# Patient Record
Sex: MALE | Race: WHITE | NOT HISPANIC OR LATINO
[De-identification: names, ages, dates, MRNs, and addresses within clinical notes are randomized per-mention and may not be internally consistent; named-entity substitution may affect disease eponyms.]

---

## 2020-11-20 PROBLEM — Z00.00 ENCOUNTER FOR PREVENTIVE HEALTH EXAMINATION: Status: ACTIVE | Noted: 2020-11-20

## 2020-11-25 ENCOUNTER — APPOINTMENT (OUTPATIENT)
Dept: UROLOGY | Facility: CLINIC | Age: 50
End: 2020-11-25
Payer: COMMERCIAL

## 2020-11-25 VITALS
HEART RATE: 79 BPM | SYSTOLIC BLOOD PRESSURE: 140 MMHG | HEIGHT: 68 IN | BODY MASS INDEX: 21.98 KG/M2 | WEIGHT: 145 LBS | TEMPERATURE: 97.8 F | RESPIRATION RATE: 17 BRPM | DIASTOLIC BLOOD PRESSURE: 81 MMHG

## 2020-11-25 PROCEDURE — 99204 OFFICE O/P NEW MOD 45 MIN: CPT

## 2020-11-25 NOTE — ASSESSMENT
[FreeTextEntry1] : 50M no PMH here w/urinary retention, BPH\par -- CIC teaching\par -- MRI prostate to eval prostate anatomy\par -- c/w dutasteride and alfuzosin

## 2020-11-25 NOTE — PHYSICAL EXAM
[General Appearance - Well Developed] : well developed [General Appearance - Well Nourished] : well nourished [Heart Rate And Rhythm] : Heart rate and rhythm were normal [] : no respiratory distress [Abdomen Soft] : soft [Abdomen Tenderness] : non-tender [Prostate Tenderness] : the prostate was not tender [No Prostate Nodules] : no prostate nodules [Prostate Size ___ gm] : prostate size [unfilled] gm [Normal Station and Gait] : the gait and station were normal for the patient's age [Skin Color & Pigmentation] : normal skin color and pigmentation [No Focal Deficits] : no focal deficits [Oriented To Time, Place, And Person] : oriented to person, place, and time [No Palpable Adenopathy] : no palpable adenopathy [FreeTextEntry1] : avery in place

## 2020-11-25 NOTE — HISTORY OF PRESENT ILLNESS
[FreeTextEntry1] : 50M no PMH here w/urinary retention. Reports inability to void in mid-October, subsequently had catheter placed in ED. Pt failed TOV at outside urologist Dr. VALENCIA Farrar and required catheter to be replaced. Subsequent cystoscopy revealed trilobar hypertrophy, UDS revealed normal filling phase w/normal sensation, desire w/o detrusor overactivity. Pt voided 4cc, max flow 8.2 m/s, PDet 58cm H2O consistent w/obstructive uropathy. PSA 11/3 22.1- obtained during retention episode. \par \par Notably, pt was referred to urologist after perineal discomfort 5 years prior. Pt states he had an elevated PSA, and was subsequently biopsied, according to pt - benign. Pt was placed on alfuzosin and start dutasteride 1.5 mo prior to visit.\par \par PMH: none\par PSH: none\par NKDA\par SHx: never smoker\par FHx: no family hx of  malignancies \par

## 2020-12-16 ENCOUNTER — APPOINTMENT (OUTPATIENT)
Dept: UROLOGY | Facility: CLINIC | Age: 50
End: 2020-12-16
Payer: COMMERCIAL

## 2020-12-16 VITALS
BODY MASS INDEX: 22.73 KG/M2 | WEIGHT: 150 LBS | DIASTOLIC BLOOD PRESSURE: 68 MMHG | TEMPERATURE: 97 F | SYSTOLIC BLOOD PRESSURE: 130 MMHG | HEART RATE: 79 BPM | HEIGHT: 68 IN | RESPIRATION RATE: 17 BRPM

## 2020-12-16 DIAGNOSIS — R33.9 RETENTION OF URINE, UNSPECIFIED: ICD-10-CM

## 2020-12-16 PROCEDURE — 99213 OFFICE O/P EST LOW 20 MIN: CPT

## 2020-12-16 PROCEDURE — 99072 ADDL SUPL MATRL&STAF TM PHE: CPT

## 2020-12-16 NOTE — ASSESSMENT
[FreeTextEntry1] : 50M no PMH here w/urinary retention, BPH\par -- mpMRI reviewed - 78cc prostate, overall PIRADS2, no suspicious lesions, small intravesical median lobe\par -- r/b/a of outlet procedures discussed, including retrograde ejaculation\par -- will plan on transperineal biopsy

## 2020-12-16 NOTE — HISTORY OF PRESENT ILLNESS
[FreeTextEntry1] : 50M no PMH here w/urinary retention. Reports inability to void in mid-October, subsequently had catheter placed in ED. Pt failed TOV at outside urologist Dr. VALENCIA Farrar and required catheter to be replaced. Subsequent cystoscopy revealed trilobar hypertrophy, UDS revealed normal filling phase w/normal sensation, desire w/o detrusor overactivity. Pt voided 4cc, max flow 8.2 m/s, PDet 58cm H2O consistent w/obstructive uropathy. PSA 11/3 22.1- obtained during retention episode. \par \par Notably, pt was referred to urologist after perineal discomfort 5 years prior. Pt states he had an elevated PSA, and was subsequently biopsied, according to pt - benign. Pt was placed on alfuzosin and start dutasteride 1.5 mo prior to visit.\par \par Interval hx: was taught CIC during last visit. Unable to catheterize at home that day, developed some hematuria. Indwelling catheter placed in ED. Last week, saw Dr. Farrar, who exchanged catheter. Pt states PSA was ~15 at that time. Pt otherwise at baseline, no fevers. Some cloudy urine.

## 2020-12-30 ENCOUNTER — APPOINTMENT (OUTPATIENT)
Dept: UROLOGY | Facility: CLINIC | Age: 50
End: 2020-12-30
Payer: COMMERCIAL

## 2020-12-30 ENCOUNTER — OUTPATIENT (OUTPATIENT)
Dept: OUTPATIENT SERVICES | Facility: HOSPITAL | Age: 50
LOS: 1 days | End: 2020-12-30

## 2020-12-30 ENCOUNTER — OUTPATIENT (OUTPATIENT)
Dept: OUTPATIENT SERVICES | Facility: HOSPITAL | Age: 50
LOS: 1 days | End: 2020-12-30
Payer: COMMERCIAL

## 2020-12-30 VITALS — HEART RATE: 62 BPM | DIASTOLIC BLOOD PRESSURE: 88 MMHG | SYSTOLIC BLOOD PRESSURE: 133 MMHG

## 2020-12-30 VITALS
RESPIRATION RATE: 16 BRPM | DIASTOLIC BLOOD PRESSURE: 80 MMHG | HEART RATE: 76 BPM | HEIGHT: 69 IN | TEMPERATURE: 100 F | WEIGHT: 143.96 LBS | SYSTOLIC BLOOD PRESSURE: 126 MMHG | OXYGEN SATURATION: 98 %

## 2020-12-30 VITALS — SYSTOLIC BLOOD PRESSURE: 153 MMHG | HEART RATE: 65 BPM | DIASTOLIC BLOOD PRESSURE: 97 MMHG | RESPIRATION RATE: 17 BRPM

## 2020-12-30 DIAGNOSIS — N40.0 BENIGN PROSTATIC HYPERPLASIA WITHOUT LOWER URINARY TRACT SYMPTOMS: ICD-10-CM

## 2020-12-30 DIAGNOSIS — N40.1 BENIGN PROSTATIC HYPERPLASIA WITH LOWER URINARY TRACT SYMPTOMS: ICD-10-CM

## 2020-12-30 DIAGNOSIS — R35.0 FREQUENCY OF MICTURITION: ICD-10-CM

## 2020-12-30 DIAGNOSIS — Z98.890 OTHER SPECIFIED POSTPROCEDURAL STATES: Chronic | ICD-10-CM

## 2020-12-30 DIAGNOSIS — N39.0 URINARY TRACT INFECTION, SITE NOT SPECIFIED: ICD-10-CM

## 2020-12-30 PROCEDURE — 55700: CPT

## 2020-12-30 PROCEDURE — 76942 ECHO GUIDE FOR BIOPSY: CPT | Mod: 26,59

## 2020-12-30 PROCEDURE — 76942 ECHO GUIDE FOR BIOPSY: CPT | Mod: 59

## 2020-12-30 PROCEDURE — 76872 US TRANSRECTAL: CPT | Mod: 26

## 2020-12-30 PROCEDURE — 76872 US TRANSRECTAL: CPT

## 2020-12-30 NOTE — H&P PST ADULT - NSANTHOSAYNRD_GEN_A_CORE
never tested/No. CYNDIE screening performed.  STOP BANG Legend: 0-2 = LOW Risk; 3-4 = INTERMEDIATE Risk; 5-8 = HIGH Risk

## 2020-12-30 NOTE — H&P PST ADULT - NSICDXPROBLEM_GEN_ALL_CORE_FT
PROBLEM DIAGNOSES  Problem: BPH (benign prostatic hyperplasia)  Assessment and Plan:        PROBLEM DIAGNOSES  Problem: BPH (benign prostatic hyperplasia)  Assessment and Plan: Pt scheduled for cystoscopy, aqua ablation transurethral waterjet ablation of prostate.   labs pending,  Preop instructions provided to pt.   famotidine provided to pt with instructions.  Pt going for COVID testing on 1/1/21.

## 2020-12-30 NOTE — H&P PST ADULT - NS PRO ABUSE SCREEN AFRAID ANYONE YN
Chief Complaint   Patient presents with     Blood Draw     Labs Drawn      Labs drawn from central line. Both lumens flushed with heparin.     Lauren Schoen, RN     no

## 2020-12-30 NOTE — H&P PST ADULT - ASSESSMENT
50 yrs old male with h/o enlarged prostate presents for preop eval to have cystoscopy, aqua ablation, transurethral waterjet ablation of prostate.

## 2020-12-30 NOTE — H&P PST ADULT - LAB RESULTS AND INTERPRETATION
PA info faxed    Plan does not cover Testosterone (ANDROGEL) 20.25 MG/1.25GM (1.62%) GEL.  Please go to [email or web address] to initiate Prior Auth or change med.    Insurance type and ID number: Key:  [id number]      Additional Information:     Devora Resendiz     cbc, bmp, and urine culture pending

## 2020-12-30 NOTE — H&P PST ADULT - RS GEN PE MLT RESP DETAILS PC
airway patent/respirations non-labored/clear to auscultation bilaterally/no rales/no rhonchi/no wheezes

## 2020-12-31 ENCOUNTER — NON-APPOINTMENT (OUTPATIENT)
Age: 50
End: 2020-12-31

## 2020-12-31 DIAGNOSIS — Z01.818 ENCOUNTER FOR OTHER PREPROCEDURAL EXAMINATION: ICD-10-CM

## 2020-12-31 PROBLEM — N39.0 ACUTE UTI: Status: ACTIVE | Noted: 2020-12-31

## 2020-12-31 LAB
CORE LAB BIOPSY: NORMAL
HCT VFR BLD CALC: 44.7 % — SIGNIFICANT CHANGE UP (ref 39–50)
HGB BLD-MCNC: 14.7 G/DL — SIGNIFICANT CHANGE UP (ref 13–17)
MCHC RBC-ENTMCNC: 28.9 PG — SIGNIFICANT CHANGE UP (ref 27–34)
MCHC RBC-ENTMCNC: 32.9 GM/DL — SIGNIFICANT CHANGE UP (ref 32–36)
MCV RBC AUTO: 88 FL — SIGNIFICANT CHANGE UP (ref 80–100)
PLATELET # BLD AUTO: 265 K/UL — SIGNIFICANT CHANGE UP (ref 150–400)
RBC # BLD: 5.08 M/UL — SIGNIFICANT CHANGE UP (ref 4.2–5.8)
RBC # FLD: 12.7 % — SIGNIFICANT CHANGE UP (ref 10.3–14.5)
WBC # BLD: 11.79 K/UL — HIGH (ref 3.8–10.5)
WBC # FLD AUTO: 11.79 K/UL — HIGH (ref 3.8–10.5)

## 2021-01-01 ENCOUNTER — APPOINTMENT (OUTPATIENT)
Dept: DISASTER EMERGENCY | Facility: CLINIC | Age: 51
End: 2021-01-01

## 2021-01-01 LAB
ANION GAP SERPL CALC-SCNC: 25 MMOL/L — HIGH (ref 5–17)
BUN SERPL-MCNC: 20 MG/DL — SIGNIFICANT CHANGE UP (ref 7–23)
CALCIUM SERPL-MCNC: 9.3 MG/DL — SIGNIFICANT CHANGE UP (ref 8.4–10.5)
CHLORIDE SERPL-SCNC: 101 MMOL/L — SIGNIFICANT CHANGE UP (ref 96–108)
CO2 SERPL-SCNC: 18 MMOL/L — LOW (ref 22–31)
CREAT SERPL-MCNC: 0.91 MG/DL — SIGNIFICANT CHANGE UP (ref 0.5–1.3)
CULTURE RESULTS: SIGNIFICANT CHANGE UP
GLUCOSE SERPL-MCNC: 28 MG/DL — CRITICAL LOW (ref 70–99)
POTASSIUM SERPL-MCNC: 3.8 MMOL/L — SIGNIFICANT CHANGE UP (ref 3.5–5.3)
POTASSIUM SERPL-SCNC: 3.8 MMOL/L — SIGNIFICANT CHANGE UP (ref 3.5–5.3)
SODIUM SERPL-SCNC: 144 MMOL/L — SIGNIFICANT CHANGE UP (ref 135–145)
SPECIMEN SOURCE: SIGNIFICANT CHANGE UP

## 2021-01-02 ENCOUNTER — APPOINTMENT (OUTPATIENT)
Dept: MRI IMAGING | Facility: IMAGING CENTER | Age: 51
End: 2021-01-02

## 2021-01-02 PROBLEM — Z87.438 PERSONAL HISTORY OF OTHER DISEASES OF MALE GENITAL ORGANS: Chronic | Status: ACTIVE | Noted: 2020-12-30

## 2021-01-02 PROBLEM — R39.11 HESITANCY OF MICTURITION: Chronic | Status: ACTIVE | Noted: 2020-12-30

## 2021-01-02 LAB — SARS-COV-2 N GENE NPH QL NAA+PROBE: NOT DETECTED

## 2021-01-03 ENCOUNTER — TRANSCRIPTION ENCOUNTER (OUTPATIENT)
Age: 51
End: 2021-01-03

## 2021-01-04 ENCOUNTER — INPATIENT (INPATIENT)
Facility: HOSPITAL | Age: 51
LOS: 0 days | Discharge: HOME | End: 2021-01-05
Attending: UROLOGY | Admitting: UROLOGY
Payer: COMMERCIAL

## 2021-01-04 ENCOUNTER — APPOINTMENT (OUTPATIENT)
Dept: UROLOGY | Facility: HOSPITAL | Age: 51
End: 2021-01-04

## 2021-01-04 ENCOUNTER — RESULT REVIEW (OUTPATIENT)
Age: 51
End: 2021-01-04

## 2021-01-04 VITALS
OXYGEN SATURATION: 98 % | SYSTOLIC BLOOD PRESSURE: 126 MMHG | TEMPERATURE: 100 F | HEART RATE: 76 BPM | RESPIRATION RATE: 16 BRPM | DIASTOLIC BLOOD PRESSURE: 80 MMHG | HEIGHT: 69 IN | WEIGHT: 143.96 LBS

## 2021-01-04 DIAGNOSIS — I95.9 HYPOTENSION, UNSPECIFIED: ICD-10-CM

## 2021-01-04 DIAGNOSIS — Z87.438 PERSONAL HISTORY OF OTHER DISEASES OF MALE GENITAL ORGANS: ICD-10-CM

## 2021-01-04 DIAGNOSIS — N39.0 URINARY TRACT INFECTION, SITE NOT SPECIFIED: ICD-10-CM

## 2021-01-04 DIAGNOSIS — Z98.890 OTHER SPECIFIED POSTPROCEDURAL STATES: Chronic | ICD-10-CM

## 2021-01-04 DIAGNOSIS — N40.1 BENIGN PROSTATIC HYPERPLASIA WITH LOWER URINARY TRACT SYMPTOMS: ICD-10-CM

## 2021-01-04 PROCEDURE — 99223 1ST HOSP IP/OBS HIGH 75: CPT | Mod: 25

## 2021-01-04 PROCEDURE — 88305 TISSUE EXAM BY PATHOLOGIST: CPT | Mod: 26

## 2021-01-04 RX ORDER — PIPERACILLIN AND TAZOBACTAM 4; .5 G/20ML; G/20ML
3.38 INJECTION, POWDER, LYOPHILIZED, FOR SOLUTION INTRAVENOUS ONCE
Refills: 0 | Status: COMPLETED | OUTPATIENT
Start: 2021-01-04 | End: 2021-01-04

## 2021-01-04 RX ORDER — HEPARIN SODIUM 5000 [USP'U]/ML
5000 INJECTION INTRAVENOUS; SUBCUTANEOUS EVERY 8 HOURS
Refills: 0 | Status: DISCONTINUED | OUTPATIENT
Start: 2021-01-04 | End: 2021-01-05

## 2021-01-04 RX ORDER — ACETAMINOPHEN 500 MG
650 TABLET ORAL EVERY 6 HOURS
Refills: 0 | Status: DISCONTINUED | OUTPATIENT
Start: 2021-01-04 | End: 2021-01-05

## 2021-01-04 RX ORDER — PIPERACILLIN AND TAZOBACTAM 4; .5 G/20ML; G/20ML
3.38 INJECTION, POWDER, LYOPHILIZED, FOR SOLUTION INTRAVENOUS EVERY 8 HOURS
Refills: 0 | Status: DISCONTINUED | OUTPATIENT
Start: 2021-01-04 | End: 2021-01-05

## 2021-01-04 RX ORDER — SODIUM CHLORIDE 9 MG/ML
1000 INJECTION INTRAMUSCULAR; INTRAVENOUS; SUBCUTANEOUS ONCE
Refills: 0 | Status: COMPLETED | OUTPATIENT
Start: 2021-01-04 | End: 2021-01-05

## 2021-01-04 RX ORDER — OXYCODONE HYDROCHLORIDE 5 MG/1
10 TABLET ORAL EVERY 4 HOURS
Refills: 0 | Status: DISCONTINUED | OUTPATIENT
Start: 2021-01-04 | End: 2021-01-05

## 2021-01-04 RX ORDER — OXYCODONE HYDROCHLORIDE 5 MG/1
5 TABLET ORAL EVERY 4 HOURS
Refills: 0 | Status: DISCONTINUED | OUTPATIENT
Start: 2021-01-04 | End: 2021-01-05

## 2021-01-04 RX ORDER — SODIUM CHLORIDE 9 MG/ML
1000 INJECTION, SOLUTION INTRAVENOUS
Refills: 0 | Status: DISCONTINUED | OUTPATIENT
Start: 2021-01-04 | End: 2021-01-05

## 2021-01-04 RX ORDER — PIPERACILLIN AND TAZOBACTAM 4; .5 G/20ML; G/20ML
3.38 INJECTION, POWDER, LYOPHILIZED, FOR SOLUTION INTRAVENOUS EVERY 6 HOURS
Refills: 0 | Status: DISCONTINUED | OUTPATIENT
Start: 2021-01-04 | End: 2021-01-04

## 2021-01-04 RX ORDER — SODIUM CHLORIDE 9 MG/ML
1000 INJECTION, SOLUTION INTRAVENOUS
Refills: 0 | Status: DISCONTINUED | OUTPATIENT
Start: 2021-01-04 | End: 2021-01-04

## 2021-01-04 RX ADMIN — PIPERACILLIN AND TAZOBACTAM 200 GRAM(S): 4; .5 INJECTION, POWDER, LYOPHILIZED, FOR SOLUTION INTRAVENOUS at 15:10

## 2021-01-04 RX ADMIN — PIPERACILLIN AND TAZOBACTAM 25 GRAM(S): 4; .5 INJECTION, POWDER, LYOPHILIZED, FOR SOLUTION INTRAVENOUS at 21:55

## 2021-01-04 RX ADMIN — HEPARIN SODIUM 5000 UNIT(S): 5000 INJECTION INTRAVENOUS; SUBCUTANEOUS at 15:08

## 2021-01-04 RX ADMIN — HEPARIN SODIUM 5000 UNIT(S): 5000 INJECTION INTRAVENOUS; SUBCUTANEOUS at 21:58

## 2021-01-04 NOTE — CONSULT NOTE ADULT - SUBJECTIVE AND OBJECTIVE BOX
PMD Dr. David Hansen [phone]    Patient is a 50y old  Male who presents with a chief complaint of     HPI: 50 yrs old male with h/o enlarged prostate presents for preop eval to have cystoscopy, aqua ablation, transurethral waterjet ablation of prostate.    Allergies: No Known Allergies    HOME MEDICATIONS:   alfuzosin 10 qd  Bactrim 1 bid    MEDICATIONS  (STANDING):  heparin   Injectable 5000 Unit(s) SubCutaneous every 8 hours  lactated ringers. 1000 milliLiter(s) (125 mL/Hr) IV Continuous <Continuous>  piperacillin/tazobactam IVPB.. 3.375 Gram(s) IV Intermittent every 8 hours    MEDICATIONS  (PRN):  acetaminophen   Tablet .. 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;  rare alcohol    FAMILY HISTORY:  [ x] No pertinent family history in first degree relatives   father - heart problem ?stents    REVIEW OF SYSTEMS:  CONSTITUTIONAL: No fever, weight loss, or fatigue  EYES: No eye pain, visual disturbances, or discharge  ENMT:  No difficulty hearing, tinnitus, vertigo; No sinus or throat pain  NECK: No pain or stiffness  BREASTS: No pain, masses, or nipple discharge  RESPIRATORY: No cough, wheezing, chills or hemoptysis; No shortness of breath  CARDIOVASCULAR: No chest pain, palpitations, dizziness, or leg swelling  GASTROINTESTINAL: No abdominal or epigastric pain. No nausea, vomiting, or hematemesis; No diarrhea or constipation. No melena or hematochezia.  GENITOURINARY: per HPI  NEUROLOGICAL: No headaches, memory loss, loss of strength, numbness, or tremors  SKIN: No itching, burning, rashes, or lesions   LYMPH NODES: No enlarged glands  ENDOCRINE: No heat or cold intolerance; No hair loss  MUSCULOSKELETAL: No muscle or back pain  PSYCHIATRIC: No depression, anxiety, mood swings, or difficulty sleeping  HEME/LYMPH: No easy bruising, or bleeding gums  ALLERGY AND IMMUNOLOGIC: No hives or eczema  [  ] All other ROS negative  [  ] Unable to obtain due to poor mental status    Vital Signs Last 24 Hrs  T(C): 36.3 (04 Jan 2021 15:00), Max: 37.6 (04 Jan 2021 08:51)  T(F): 97.3 (04 Jan 2021 15:00), Max: 99.7 (04 Jan 2021 08:51)  HR: 53 (04 Jan 2021 16:00) (50 - 76)  BP: 96/52 (04 Jan 2021 16:00) (89/51 - 126/80)  BP(mean): 65 (04 Jan 2021 16:00) (60 - 89)  RR: 14 (04 Jan 2021 16:00) (12 - 16)  SpO2: 98% (04 Jan 2021 16:00) (98% - 100%)    PHYSICAL EXAM:  GENERAL: resting on stretcher in PACU  HEAD:  Atraumatic, Normocephalic  EYES: EOMI, PERRLA, conjunctiva and sclera clear  ENMT: Moist mucous membranes  NECK: Supple, No JVD  RESPIRATORY: Clear to auscultation bilaterally; No rales, rhonchi, wheezing, or rubs  CARDIOVASCULAR: Regular rate and rhythm; No murmurs, rubs, or gallops  GASTROINTESTINAL: Soft, Nontender, Nondistended; Bowel sounds present  GENITOURINARY: +avery CBI pink  EXTREMITIES:  2+ Peripheral Pulses, No clubbing, cyanosis, or edema  NERVOUS SYSTEM:  Alert & Oriented X3; Moving all 4 extremities; No gross sensory deficits  HEME/LYMPH: No lymphadenopathy noted  SKIN: No rashes or lesions  PSYCH: calm, appropriate    LABS:  Basic Metabolic Panel (01.01.21 @ 04:19)    Sodium, Serum: 144 mmol/L    Potassium, Serum: 3.8 mmol/L    Chloride, Serum: 101 mmol/L    Carbon Dioxide, Serum: 18 mmol/L    Anion Gap, Serum: 25 mmol/L    Blood Urea Nitrogen, Serum: 20 mg/dL    Creatinine, Serum: 0.91 mg/dL    Glucose, Serum: 28: Test Repeated  Critical value  If not drawn in a gray top tube, low glucose may be due to delays in  centrifugation. mg/dL    Calcium, Total Serum: 9.3 mg/dL    eGFR if Non : 99:     Complete Blood Count (12.31.20 @ 03:49)    WBC Count: 11.79 K/uL    RBC Count: 5.08 M/uL    Hemoglobin: 14.7 g/dL    Hematocrit: 44.7 %    Mean Cell Volume: 88.0 fl    Mean Cell Hemoglobin: 28.9 pg    Mean Cell Hemoglobin Conc: 32.9 gm/dL    Red Cell Distrib Width: 12.7 %    Platelet Count - Automated: 265 K/uL    RADIOLOGY & ADDITIONAL STUDIES:    EKG:   Personally Reviewed:  [ ] YES     Imaging:   Personally Reviewed:  [ ] YES               Consultant(s) notes reviewed:    Care Discussed with Consultant(s)/Other Providers: urology re overall care PMD Dr. David Hansen [phone]    Patient is a 50y old  Male who presents with a chief complaint of BPH with urinary obstruction    HPI: 50M in October began having trouble voiding, required avery, failed TOV, cysto showed trilobar hypertrophy, biopsy done 12/30.  Had low grade temp and mild hematuria, started on Bactrim. Now 1/4/21 s/p cystoscopy, aqua ablation, transurethral waterjet ablation of prostate. +CBI.  Pt doing ok, no c/o pain, SOB, nausea.      Allergies: No Known Allergies    HOME MEDICATIONS:   alfuzosin 10 qd  Bactrim 1 bid    MEDICATIONS  (STANDING):  heparin   Injectable 5000 Unit(s) SubCutaneous every 8 hours  lactated ringers. 1000 milliLiter(s) (125 mL/Hr) IV Continuous <Continuous>  piperacillin/tazobactam IVPB.. 3.375 Gram(s) IV Intermittent every 8 hours    MEDICATIONS  (PRN):  acetaminophen   Tablet .. 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;  rare alcohol    FAMILY HISTORY:  [ x] No pertinent family history in first degree relatives   father - heart problem ?stents    REVIEW OF SYSTEMS:  CONSTITUTIONAL: No fever, weight loss, or fatigue  EYES: No eye pain, visual disturbances, or discharge  ENMT:  No difficulty hearing, tinnitus, vertigo; No sinus or throat pain  NECK: No pain or stiffness  BREASTS: No pain, masses, or nipple discharge  RESPIRATORY: No cough, wheezing, chills or hemoptysis; No shortness of breath  CARDIOVASCULAR: No chest pain, palpitations, dizziness, or leg swelling  GASTROINTESTINAL: No abdominal or epigastric pain. No nausea, vomiting, or hematemesis; No diarrhea or constipation. No melena or hematochezia.  GENITOURINARY: per HPI  NEUROLOGICAL: No headaches, memory loss, loss of strength, numbness, or tremors  SKIN: No itching, burning, rashes, or lesions   LYMPH NODES: No enlarged glands  ENDOCRINE: No heat or cold intolerance; No hair loss  MUSCULOSKELETAL: No muscle or back pain  PSYCHIATRIC: No depression, anxiety, mood swings, or difficulty sleeping  HEME/LYMPH: No easy bruising, or bleeding gums  ALLERGY AND IMMUNOLOGIC: No hives or eczema  [  ] All other ROS negative  [  ] Unable to obtain due to poor mental status    Vital Signs Last 24 Hrs  T(C): 36.3 (04 Jan 2021 15:00), Max: 37.6 (04 Jan 2021 08:51)  T(F): 97.3 (04 Jan 2021 15:00), Max: 99.7 (04 Jan 2021 08:51)  HR: 53 (04 Jan 2021 16:00) (50 - 76)  BP: 96/52 (04 Jan 2021 16:00) (89/51 - 126/80)  BP(mean): 65 (04 Jan 2021 16:00) (60 - 89)  RR: 14 (04 Jan 2021 16:00) (12 - 16)  SpO2: 98% (04 Jan 2021 16:00) (98% - 100%)    PHYSICAL EXAM:  GENERAL: resting on stretcher in PACU  HEAD:  Atraumatic, Normocephalic  EYES: EOMI, PERRLA, conjunctiva and sclera clear  ENMT: Moist mucous membranes  NECK: Supple, No JVD  RESPIRATORY: Clear to auscultation bilaterally; No rales, rhonchi, wheezing, or rubs  CARDIOVASCULAR: Regular rate and rhythm; No murmurs, rubs, or gallops  GASTROINTESTINAL: Soft, Nontender, Nondistended; Bowel sounds present  GENITOURINARY: +avery CBI pink  EXTREMITIES:  2+ Peripheral Pulses, No clubbing, cyanosis, or edema  NERVOUS SYSTEM:  Alert & Oriented X3; Moving all 4 extremities; No gross sensory deficits  HEME/LYMPH: No lymphadenopathy noted  SKIN: No rashes or lesions  PSYCH: calm, appropriate    LABS:  Basic Metabolic Panel (01.01.21 @ 04:19)    Sodium, Serum: 144 mmol/L    Potassium, Serum: 3.8 mmol/L    Chloride, Serum: 101 mmol/L    Carbon Dioxide, Serum: 18 mmol/L    Anion Gap, Serum: 25 mmol/L    Blood Urea Nitrogen, Serum: 20 mg/dL    Creatinine, Serum: 0.91 mg/dL    Glucose, Serum: 28: Test Repeated  Critical value  If not drawn in a gray top tube, low glucose may be due to delays in  centrifugation. mg/dL    Calcium, Total Serum: 9.3 mg/dL    eGFR if Non : 99:     Complete Blood Count (12.31.20 @ 03:49)    WBC Count: 11.79 K/uL    RBC Count: 5.08 M/uL    Hemoglobin: 14.7 g/dL    Hematocrit: 44.7 %    Mean Cell Volume: 88.0 fl    Mean Cell Hemoglobin: 28.9 pg    Mean Cell Hemoglobin Conc: 32.9 gm/dL    Red Cell Distrib Width: 12.7 %    Platelet Count - Automated: 265 K/uL    RADIOLOGY & ADDITIONAL STUDIES:    EKG:   Personally Reviewed:  [ ] YES     Imaging:   Personally Reviewed:  [ ] YES               Consultant(s) notes reviewed:    Care Discussed with Consultant(s)/Other Providers: urology re overall care

## 2021-01-04 NOTE — PROGRESS NOTE ADULT - SUBJECTIVE AND OBJECTIVE BOX
Post op check    This 49 yo M is s/p aquablation of prostate  PMH: none  Pt is awake and alert  Has no c/o  Afeb 96/52 53 98%RA (runs low)    Abd- soft; NT  Finley/CBI clear

## 2021-01-04 NOTE — CONSULT NOTE ADULT - ASSESSMENT
50M in October began having trouble voiding, required avery, failed TOV, cysto showed trilobar hypertrophy, biopsy done 12/30.  Had low grade temp and mild hematuria, started on Bactrim. Now 1/4/21 s/p cystoscopy, aqua ablation, transurethral waterjet ablation of prostate.

## 2021-01-05 ENCOUNTER — TRANSCRIPTION ENCOUNTER (OUTPATIENT)
Age: 51
End: 2021-01-05

## 2021-01-05 VITALS
DIASTOLIC BLOOD PRESSURE: 72 MMHG | HEART RATE: 60 BPM | SYSTOLIC BLOOD PRESSURE: 137 MMHG | RESPIRATION RATE: 16 BRPM | TEMPERATURE: 98 F | OXYGEN SATURATION: 100 %

## 2021-01-05 DIAGNOSIS — D62 ACUTE POSTHEMORRHAGIC ANEMIA: ICD-10-CM

## 2021-01-05 LAB
ANION GAP SERPL CALC-SCNC: 8 MMOL/L — SIGNIFICANT CHANGE UP (ref 7–14)
BASOPHILS # BLD AUTO: 0.01 K/UL — SIGNIFICANT CHANGE UP (ref 0–0.2)
BASOPHILS NFR BLD AUTO: 0.1 % — SIGNIFICANT CHANGE UP (ref 0–2)
BUN SERPL-MCNC: 15 MG/DL — SIGNIFICANT CHANGE UP (ref 7–23)
CALCIUM SERPL-MCNC: 8.2 MG/DL — LOW (ref 8.4–10.5)
CHLORIDE SERPL-SCNC: 103 MMOL/L — SIGNIFICANT CHANGE UP (ref 98–107)
CO2 SERPL-SCNC: 24 MMOL/L — SIGNIFICANT CHANGE UP (ref 22–31)
CREAT SERPL-MCNC: 0.86 MG/DL — SIGNIFICANT CHANGE UP (ref 0.5–1.3)
EOSINOPHIL # BLD AUTO: 0 K/UL — SIGNIFICANT CHANGE UP (ref 0–0.5)
EOSINOPHIL NFR BLD AUTO: 0 % — SIGNIFICANT CHANGE UP (ref 0–6)
GLUCOSE SERPL-MCNC: 136 MG/DL — HIGH (ref 70–99)
HCT VFR BLD CALC: 35.5 % — LOW (ref 39–50)
HGB BLD-MCNC: 11.6 G/DL — LOW (ref 13–17)
IANC: 7.06 K/UL — SIGNIFICANT CHANGE UP (ref 1.5–8.5)
IMM GRANULOCYTES NFR BLD AUTO: 0.4 % — SIGNIFICANT CHANGE UP (ref 0–1.5)
LYMPHOCYTES # BLD AUTO: 1.94 K/UL — SIGNIFICANT CHANGE UP (ref 1–3.3)
LYMPHOCYTES # BLD AUTO: 20.2 % — SIGNIFICANT CHANGE UP (ref 13–44)
MCHC RBC-ENTMCNC: 27.9 PG — SIGNIFICANT CHANGE UP (ref 27–34)
MCHC RBC-ENTMCNC: 32.7 GM/DL — SIGNIFICANT CHANGE UP (ref 32–36)
MCV RBC AUTO: 85.3 FL — SIGNIFICANT CHANGE UP (ref 80–100)
MONOCYTES # BLD AUTO: 0.55 K/UL — SIGNIFICANT CHANGE UP (ref 0–0.9)
MONOCYTES NFR BLD AUTO: 5.7 % — SIGNIFICANT CHANGE UP (ref 2–14)
NEUTROPHILS # BLD AUTO: 7.06 K/UL — SIGNIFICANT CHANGE UP (ref 1.8–7.4)
NEUTROPHILS NFR BLD AUTO: 73.6 % — SIGNIFICANT CHANGE UP (ref 43–77)
NRBC # BLD: 0 /100 WBCS — SIGNIFICANT CHANGE UP
NRBC # FLD: 0 K/UL — SIGNIFICANT CHANGE UP
PLATELET # BLD AUTO: 197 K/UL — SIGNIFICANT CHANGE UP (ref 150–400)
POTASSIUM SERPL-MCNC: 4.5 MMOL/L — SIGNIFICANT CHANGE UP (ref 3.5–5.3)
POTASSIUM SERPL-SCNC: 4.5 MMOL/L — SIGNIFICANT CHANGE UP (ref 3.5–5.3)
RBC # BLD: 4.16 M/UL — LOW (ref 4.2–5.8)
RBC # FLD: 12.7 % — SIGNIFICANT CHANGE UP (ref 10.3–14.5)
SODIUM SERPL-SCNC: 135 MMOL/L — SIGNIFICANT CHANGE UP (ref 135–145)
TSH SERPL-MCNC: 0.35 UIU/ML — SIGNIFICANT CHANGE UP (ref 0.27–4.2)
WBC # BLD: 9.6 K/UL — SIGNIFICANT CHANGE UP (ref 3.8–10.5)
WBC # FLD AUTO: 9.6 K/UL — SIGNIFICANT CHANGE UP (ref 3.8–10.5)

## 2021-01-05 PROCEDURE — 99233 SBSQ HOSP IP/OBS HIGH 50: CPT

## 2021-01-05 RX ADMIN — PIPERACILLIN AND TAZOBACTAM 25 GRAM(S): 4; .5 INJECTION, POWDER, LYOPHILIZED, FOR SOLUTION INTRAVENOUS at 06:03

## 2021-01-05 RX ADMIN — HEPARIN SODIUM 5000 UNIT(S): 5000 INJECTION INTRAVENOUS; SUBCUTANEOUS at 06:03

## 2021-01-05 RX ADMIN — SODIUM CHLORIDE 1000 MILLILITER(S): 9 INJECTION INTRAMUSCULAR; INTRAVENOUS; SUBCUTANEOUS at 00:02

## 2021-01-05 NOTE — PROGRESS NOTE ADULT - ASSESSMENT
stable s/p aquablation of prostate  Plan:  - OOB  - AM labs  - monitor color
50M hx of BPH w/urinary retention now s/p transurethral waterjet ablation of prostate  -- await TOV  -- monitor color  -- hypotension and bradycardia - pt w/indwelling avery catheter prior to procedure, contaminated urine culture. Hct 35.5, WBC 9. Will obtain EKG, trend pressures  -- await BMP  -- IS/OOB/analgesia PRN
50M in October began having trouble voiding, required avery, failed TOV, cysto showed trilobar hypertrophy, biopsy done 12/30.  Had low grade temp and mild hematuria, started on Bactrim. 1/4/21 s/p cystoscopy, aqua ablation, transurethral waterjet ablation of prostate.

## 2021-01-05 NOTE — DISCHARGE NOTE NURSING/CASE MANAGEMENT/SOCIAL WORK - NSDCPNINST_GEN_ALL_CORE
Notify Dr Rowell if you experience any increase in pain not relieved with [pain medication, any persistent nausea or vomiting any bright red blood in urine or any fever >100.5.  Drink plenty of fluids.  No heavy lifting or straining.  Use over the counter stool softeners to assist with constipation which can be a side effect of narcotic pain medication.

## 2021-01-05 NOTE — PROGRESS NOTE ADULT - SUBJECTIVE AND OBJECTIVE BOX
Subjective  Pt w/hypotension to 86/54, bradycardia to 45, received 1L bolus overnight. Endorses some avery discomfort. Pt denies fevers, chills.     Objective    Vital signs  T(F): , Max: 99.7 (01-04-21 @ 08:51)  HR: 45 (01-05-21 @ 04:58)  BP: 97/51 (01-05-21 @ 04:58)  SpO2: 100% (01-05-21 @ 04:58)  Wt(kg): --    Output     01-04 @ 07:01  -  01-05 @ 07:00  --------------------------------------------------------  IN: 1375 mL / OUT: 0 mL / NET: 1375 mL        Gen: NAD, nontoxic appearing  Abd: soft, NT, ND  : output translucent clear red off CBI    Labs      01-05 @ 06:39    WBC 9.60  / Hct 35.5  / SCr --       Imaging

## 2021-01-05 NOTE — PROGRESS NOTE ADULT - SUBJECTIVE AND OBJECTIVE BOX
[facility] Division of Hospital Medicine  Barbara Hansen MD  Pager (M-F, 8A-5P):  In-house pager [phone]; Long-range pager [phone]  Other Times:  Please page Hospitalist in Charge -  In-house pager [phone]    Patient is a 50y old  Male who presents with a chief complaint of surgery (04 Jan 2021 16:43)    SUBJECTIVE / OVERNIGHT EVENTS: BP low over night, given 1L fluid bolus.  Doing ok this morning.  Denies chest pain, SOB, dizziness.    ADDITIONAL REVIEW OF SYSTEMS:    MEDICATIONS  (STANDING):  heparin   Injectable 5000 Unit(s) SubCutaneous every 8 hours  piperacillin/tazobactam IVPB.. 3.375 Gram(s) IV Intermittent every 8 hours    MEDICATIONS  (PRN):  acetaminophen   Tablet .. 650 milliGRAM(s) Oral every 6 hours PRN Mild Pain (1 - 3)  oxyCODONE    IR 5 milliGRAM(s) Oral every 4 hours PRN Moderate Pain (4 - 6)  oxyCODONE    IR 10 milliGRAM(s) Oral every 4 hours PRN Severe Pain (7 - 10)      CAPILLARY BLOOD GLUCOSE        I&O's Summary    04 Jan 2021 07:01  -  05 Jan 2021 07:00  --------------------------------------------------------  IN: 1375 mL / OUT: 0 mL / NET: 1375 mL    05 Jan 2021 07:01  -  05 Jan 2021 11:06  --------------------------------------------------------  IN: 0 mL / OUT: 800 mL / NET: -800 mL        PHYSICAL EXAM:  Vital Signs Last 24 Hrs  T(C): 36.5 (05 Jan 2021 09:48), Max: 36.7 (04 Jan 2021 21:53)  T(F): 97.7 (05 Jan 2021 09:48), Max: 98 (04 Jan 2021 21:53)  HR: 60 (05 Jan 2021 09:48) (45 - 66)  BP: 137/72 (05 Jan 2021 09:48) (86/54 - 137/72)  BP(mean): 65 (04 Jan 2021 16:00) (60 - 89)  RR: 16 (05 Jan 2021 09:48) (12 - 17)  SpO2: 100% (05 Jan 2021 09:48) (97% - 100%)    GENERAL: resting in bed, NAD  RESPIRATORY: Clear to auscultation bilaterally; No rales, rhonchi, wheezing, or rubs  CARDIOVASCULAR: Regular rate and rhythm; No murmurs, rubs, or gallops  GASTROINTESTINAL: Soft, Nontender, Nondistended; Bowel sounds present  GENITOURINARY: +avery CBI pink  EXTREMITIES:  2+ Peripheral Pulses, No clubbing, cyanosis, or edema  NERVOUS SYSTEM:  nonfocal  PSYCH: calm, appropriate    LABS:                        11.6   9.60  )-----------( 197      ( 05 Jan 2021 06:39 )             35.5     01-05    135  |  103  |  15  ----------------------------<  136<H>  4.5   |  24  |  0.86    Ca    8.2<L>      05 Jan 2021 06:39                  RADIOLOGY & ADDITIONAL TESTS:  Results Reviewed:   Imaging Personally Reviewed:  Electrocardiogram Personally Reviewed:    COORDINATION OF CARE:  Care Discussed with Consultants/Other Providers [Y/N]: urology re overall care  Prior or Outpatient Records Reviewed [Y/N]:

## 2021-01-05 NOTE — DISCHARGE NOTE PROVIDER - HOSPITAL COURSE
Pt had aquablation of prostate yesterday; did well; kept overnite with CBI; clear; stopped this AM; urine pink; avery out and pt voided over 1L with min PVR a dark morgan; labs stable; home on bactrim 1 week; f/u 3 weeks

## 2021-01-05 NOTE — PROGRESS NOTE ADULT - PROBLEM SELECTOR PLAN 2
bp preop 126/80, bolused over night, bp up to 130s  --> check orthostatics  bradycardia while sleeping, also improved when awake; TSH added on

## 2021-01-05 NOTE — DISCHARGE NOTE PROVIDER - NSDCCPCAREPLAN_GEN_ALL_CORE_FT
PRINCIPAL DISCHARGE DIAGNOSIS  Diagnosis: History of BPH  Assessment and Plan of Treatment: Drink plenty of fluids; make appt to see your doctor in 3 weeks; call office for fever over 101; difficulty urinating; or if urine turns bright red.

## 2021-01-05 NOTE — DISCHARGE NOTE PROVIDER - CARE PROVIDER_API CALL
Gallo Rowell  UROLOGY  [address] [place] [address], [place] [address]  Phone: [phone]  Fax: [phone]  Follow Up Time:

## 2021-01-05 NOTE — DISCHARGE NOTE NURSING/CASE MANAGEMENT/SOCIAL WORK - PATIENT PORTAL LINK FT
You can access the FollowMyHealth Patient Portal offered by [facility] by registering at the following website: [email or web address]. By joining [email or web address] FollowMyHealth portal, you will also be able to view your health information using other applications (apps) compatible with our system.

## 2021-01-05 NOTE — DISCHARGE NOTE PROVIDER - NSDCMRMEDTOKEN_GEN_ALL_CORE_FT
alfuzosin 10 mg oral tablet, extended release: 1 tab(s) orally once a day  Bactrim  mg-160 mg oral tablet: 1 tab(s) orally every 12 hours

## 2021-01-08 LAB — SURGICAL PATHOLOGY STUDY: SIGNIFICANT CHANGE UP

## 2021-01-13 ENCOUNTER — APPOINTMENT (OUTPATIENT)
Dept: UROLOGY | Facility: CLINIC | Age: 51
End: 2021-01-13
Payer: COMMERCIAL

## 2021-01-13 PROCEDURE — 99024 POSTOP FOLLOW-UP VISIT: CPT

## 2021-01-13 PROCEDURE — 51798 US URINE CAPACITY MEASURE: CPT

## 2021-01-13 NOTE — HISTORY OF PRESENT ILLNESS
[FreeTextEntry1] : History of nocturia nad elevated PSA . MRI 86 cc prostate Biopsy negative . Aquablation done on 1/4/2021 . [None] : no symptoms

## 2021-01-13 NOTE — ASSESSMENT
[FreeTextEntry1] : He is very satisified with the results . His stream is good . He gets up once /night if he drinks tea before bedtimr . PVR is zero today . His urine is pink. He denies dysuria or any discomfort . \par Reassured regarding blood in urine but he has no clots . Encourage him to continue to drink fluids . Explained he can have hematuria x several months intermittently due to healing . He will return to work on 1/12/21 . Reinforced lifting and straining restrictions . \par He will follow up in 6 months . He can stop his urinary medication .

## 2021-01-15 LAB — BACTERIA UR CULT: NORMAL

## 2021-01-20 DIAGNOSIS — N39.0 URINARY TRACT INFECTION, SITE NOT SPECIFIED: ICD-10-CM

## 2021-01-20 DIAGNOSIS — R97.20 ELEVATED PROSTATE SPECIFIC ANTIGEN [PSA]: ICD-10-CM

## 2021-07-16 ENCOUNTER — APPOINTMENT (OUTPATIENT)
Dept: UROLOGY | Facility: CLINIC | Age: 51
End: 2021-07-16
Payer: COMMERCIAL

## 2021-07-16 VITALS — HEIGHT: 69.29 IN | SYSTOLIC BLOOD PRESSURE: 116 MMHG | HEART RATE: 77 BPM | DIASTOLIC BLOOD PRESSURE: 76 MMHG

## 2021-07-16 PROCEDURE — 99072 ADDL SUPL MATRL&STAF TM PHE: CPT

## 2021-07-16 PROCEDURE — 99213 OFFICE O/P EST LOW 20 MIN: CPT

## 2021-07-18 LAB — PSA SERPL-MCNC: 7.31 NG/ML

## 2021-07-19 NOTE — HISTORY OF PRESENT ILLNESS
[FreeTextEntry1] : 51 yo M here for f/u aquablation. No hematuria, no LUTS, denies frequency, urgency, nocturia. No complaints. [Urinary Incontinence] : no urinary incontinence [Urinary Retention] : no urinary retention [Urinary Urgency] : no urinary urgency [Urinary Frequency] : no urinary frequency [Nocturia] : no nocturia [Straining] : no straining [Weak Stream] : no weak stream

## 2022-02-03 NOTE — CONSULT NOTE ADULT - PROBLEM SELECTOR RECOMMENDATION 2
Price (Use Numbers Only, No Special Characters Or $): 0 Post-Care Instructions: I reviewed with the patient in detail post-care instructions. Patient is to wear sunprotection, and avoid picking at any of the treated lesions. Pt may apply Vaseline to crusted or scabbing areas. Detail Level: Detailed Anesthesia Volume In Cc: 0.5 Consent: The patient's consent was obtained including but not limited to risks of crusting, scabbing, blistering, scarring, darker or lighter pigmentary change, recurrence, incomplete removal and infection. post op management per urology, CBI, pain control, IVFs, IS, DVT ppx (SC heparin) was on Bactrim preop, now on zosyn  f/u T/clinically

## 2022-07-15 ENCOUNTER — APPOINTMENT (OUTPATIENT)
Dept: UROLOGY | Facility: CLINIC | Age: 52
End: 2022-07-15

## 2022-07-15 VITALS
HEART RATE: 62 BPM | WEIGHT: 157 LBS | SYSTOLIC BLOOD PRESSURE: 117 MMHG | BODY MASS INDEX: 22.99 KG/M2 | HEIGHT: 69.29 IN | DIASTOLIC BLOOD PRESSURE: 74 MMHG | TEMPERATURE: 98.4 F | RESPIRATION RATE: 17 BRPM

## 2022-07-15 PROCEDURE — 99214 OFFICE O/P EST MOD 30 MIN: CPT

## 2022-07-15 NOTE — ASSESSMENT
[FreeTextEntry1] : Stream is a little slow in the AM  . He is happy with the results  PVR is 2 cc\par We will do PSA today for a baseline .\par Follow up in 1 year \par He is complaining of ED . He can achieve erection but they are not sufficient for penetration . Will start a trial of Tadalafil 20 mgm

## 2022-07-15 NOTE — PHYSICAL EXAM
[General Appearance - Well Developed] : well developed [Bowel Sounds] : normal bowel sounds [Skin Color & Pigmentation] : normal skin color and pigmentation [Heart Rate And Rhythm] : Heart rate and rhythm were normal [] : no respiratory distress [Oriented To Time, Place, And Person] : oriented to person, place, and time [Normal Station and Gait] : the gait and station were normal for the patient's age [No Focal Deficits] : no focal deficits [No Palpable Adenopathy] : no palpable adenopathy

## 2022-07-15 NOTE — HISTORY OF PRESENT ILLNESS
[None] : no symptoms [FreeTextEntry1] : History of doing CIC  Elected to do Aqua ablation. Doing well

## 2022-07-17 LAB — PSA SERPL-MCNC: 7.4 NG/ML

## 2022-09-10 NOTE — PROGRESS NOTE ADULT - PROBLEM SELECTOR PROBLEM 4
November 25, 2019    Return to SCHOOL      RE:   Cheyenne Golden   [address] [facility] [address]    This is to certify that Cheyenne Golden was seen in my office on 11/25/19.          SIGNATURE: __________________________________________ 11/25/19                                            Garry Rubio PA-C      [facility]  [address] [place] [address]  Phone: [phone]  Fax: [phone]  
History of BPH
Dehydration
Pleural effusion, right

## 2023-09-11 ENCOUNTER — APPOINTMENT (OUTPATIENT)
Dept: UROLOGY | Facility: CLINIC | Age: 53
End: 2023-09-11
Payer: COMMERCIAL

## 2023-09-11 VITALS
DIASTOLIC BLOOD PRESSURE: 85 MMHG | HEIGHT: 69.29 IN | TEMPERATURE: 98 F | HEART RATE: 76 BPM | SYSTOLIC BLOOD PRESSURE: 121 MMHG | RESPIRATION RATE: 17 BRPM | WEIGHT: 158 LBS | BODY MASS INDEX: 23.14 KG/M2

## 2023-09-11 DIAGNOSIS — R97.20 ELEVATED PROSTATE, SPECIFIC ANTIGEN [PSA]: ICD-10-CM

## 2023-09-11 PROCEDURE — 99213 OFFICE O/P EST LOW 20 MIN: CPT

## 2023-09-13 LAB — PSA SERPL-MCNC: 10 NG/ML

## 2023-09-30 ENCOUNTER — APPOINTMENT (OUTPATIENT)
Dept: MRI IMAGING | Facility: IMAGING CENTER | Age: 53
End: 2023-09-30
Payer: COMMERCIAL

## 2023-09-30 ENCOUNTER — RESULT REVIEW (OUTPATIENT)
Age: 53
End: 2023-09-30

## 2023-09-30 ENCOUNTER — OUTPATIENT (OUTPATIENT)
Dept: OUTPATIENT SERVICES | Facility: HOSPITAL | Age: 53
LOS: 1 days | End: 2023-09-30
Payer: COMMERCIAL

## 2023-09-30 DIAGNOSIS — R97.20 ELEVATED PROSTATE SPECIFIC ANTIGEN [PSA]: ICD-10-CM

## 2023-09-30 DIAGNOSIS — Z00.8 ENCOUNTER FOR OTHER GENERAL EXAMINATION: ICD-10-CM

## 2023-09-30 DIAGNOSIS — Z98.890 OTHER SPECIFIED POSTPROCEDURAL STATES: Chronic | ICD-10-CM

## 2023-09-30 PROCEDURE — 72197 MRI PELVIS W/O & W/DYE: CPT

## 2023-09-30 PROCEDURE — 76498 UNLISTED MR PROCEDURE: CPT

## 2023-09-30 PROCEDURE — 76498P: CUSTOM | Mod: 26

## 2023-09-30 PROCEDURE — A9585: CPT

## 2023-09-30 PROCEDURE — 72197 MRI PELVIS W/O & W/DYE: CPT | Mod: 26

## 2023-10-18 ENCOUNTER — OUTPATIENT (OUTPATIENT)
Dept: OUTPATIENT SERVICES | Facility: HOSPITAL | Age: 53
LOS: 1 days | End: 2023-10-18

## 2023-10-18 DIAGNOSIS — Z98.890 OTHER SPECIFIED POSTPROCEDURAL STATES: Chronic | ICD-10-CM

## 2023-10-18 DIAGNOSIS — R97.20 ELEVATED PROSTATE SPECIFIC ANTIGEN [PSA]: ICD-10-CM

## 2023-10-18 PROCEDURE — C8001: CPT

## 2023-10-23 ENCOUNTER — OUTPATIENT (OUTPATIENT)
Dept: OUTPATIENT SERVICES | Facility: HOSPITAL | Age: 53
LOS: 1 days | End: 2023-10-23
Payer: COMMERCIAL

## 2023-10-23 ENCOUNTER — APPOINTMENT (OUTPATIENT)
Dept: UROLOGY | Facility: CLINIC | Age: 53
End: 2023-10-23
Payer: COMMERCIAL

## 2023-10-23 VITALS — DIASTOLIC BLOOD PRESSURE: 79 MMHG | SYSTOLIC BLOOD PRESSURE: 113 MMHG

## 2023-10-23 VITALS
SYSTOLIC BLOOD PRESSURE: 157 MMHG | RESPIRATION RATE: 17 BRPM | HEART RATE: 70 BPM | OXYGEN SATURATION: 99 % | DIASTOLIC BLOOD PRESSURE: 88 MMHG

## 2023-10-23 DIAGNOSIS — Z98.890 OTHER SPECIFIED POSTPROCEDURAL STATES: Chronic | ICD-10-CM

## 2023-10-23 DIAGNOSIS — R35.0 FREQUENCY OF MICTURITION: ICD-10-CM

## 2023-10-23 DIAGNOSIS — R97.20 ELEVATED PROSTATE, SPECIFIC ANTIGEN [PSA]: ICD-10-CM

## 2023-10-23 PROCEDURE — 55700: CPT | Mod: 59

## 2023-10-23 PROCEDURE — 55700: CPT

## 2023-10-23 PROCEDURE — 76872 US TRANSRECTAL: CPT | Mod: 26

## 2023-10-23 PROCEDURE — 76377 3D RENDER W/INTRP POSTPROCES: CPT | Mod: 26,59

## 2023-10-23 PROCEDURE — 76872 US TRANSRECTAL: CPT

## 2023-10-24 DIAGNOSIS — R97.20 ELEVATED PROSTATE SPECIFIC ANTIGEN [PSA]: ICD-10-CM

## 2023-10-30 ENCOUNTER — NON-APPOINTMENT (OUTPATIENT)
Age: 53
End: 2023-10-30

## 2023-10-30 LAB — PROSTATE BIOPSY: NORMAL

## 2023-11-06 ENCOUNTER — APPOINTMENT (OUTPATIENT)
Dept: UROLOGY | Facility: CLINIC | Age: 53
End: 2023-11-06
Payer: COMMERCIAL

## 2023-11-06 PROCEDURE — 99214 OFFICE O/P EST MOD 30 MIN: CPT | Mod: 95

## 2023-11-13 ENCOUNTER — APPOINTMENT (OUTPATIENT)
Dept: UROLOGY | Facility: CLINIC | Age: 53
End: 2023-11-13
Payer: COMMERCIAL

## 2023-11-13 VITALS — HEART RATE: 58 BPM | DIASTOLIC BLOOD PRESSURE: 82 MMHG | SYSTOLIC BLOOD PRESSURE: 128 MMHG

## 2023-11-13 PROCEDURE — 99214 OFFICE O/P EST MOD 30 MIN: CPT

## 2023-11-14 LAB — BACTERIA UR CULT: NORMAL

## 2023-12-14 ENCOUNTER — NON-APPOINTMENT (OUTPATIENT)
Age: 53
End: 2023-12-14

## 2023-12-18 ENCOUNTER — TRANSCRIPTION ENCOUNTER (OUTPATIENT)
Age: 53
End: 2023-12-18

## 2023-12-18 NOTE — ASU PATIENT PROFILE, ADULT - FALL HARM RISK - UNIVERSAL INTERVENTIONS
Bed in lowest position, wheels locked, appropriate side rails in place/Call bell, personal items and telephone in reach/Instruct patient to call for assistance before getting out of bed or chair/Non-slip footwear when patient is out of bed/[place] to call system/Physically safe environment - no spills, clutter or unnecessary equipment/Purposeful Proactive Rounding/Room/bathroom lighting operational, light cord in reach Bed in lowest position, wheels locked, appropriate side rails in place/Call bell, personal items and telephone in reach/Instruct patient to call for assistance before getting out of bed or chair/Non-slip footwear when patient is out of bed/[place] to call system/Physically safe environment - no spills, clutter or unnecessary equipment/Purposeful Proactive Rounding/Room/bathroom lighting operational, light cord in reach

## 2023-12-19 ENCOUNTER — OUTPATIENT (OUTPATIENT)
Dept: OUTPATIENT SERVICES | Facility: HOSPITAL | Age: 53
LOS: 1 days | Discharge: HOME | End: 2023-12-19
Payer: COMMERCIAL

## 2023-12-19 ENCOUNTER — APPOINTMENT (OUTPATIENT)
Dept: UROLOGY | Facility: AMBULATORY SURGERY CENTER | Age: 53
End: 2023-12-19

## 2023-12-19 ENCOUNTER — TRANSCRIPTION ENCOUNTER (OUTPATIENT)
Age: 53
End: 2023-12-19

## 2023-12-19 VITALS
DIASTOLIC BLOOD PRESSURE: 80 MMHG | TEMPERATURE: 98 F | HEIGHT: 69 IN | SYSTOLIC BLOOD PRESSURE: 132 MMHG | OXYGEN SATURATION: 98 % | HEART RATE: 68 BPM | RESPIRATION RATE: 16 BRPM | WEIGHT: 153 LBS

## 2023-12-19 VITALS
HEART RATE: 72 BPM | OXYGEN SATURATION: 96 % | TEMPERATURE: 99 F | SYSTOLIC BLOOD PRESSURE: 110 MMHG | DIASTOLIC BLOOD PRESSURE: 54 MMHG | RESPIRATION RATE: 16 BRPM

## 2023-12-19 DIAGNOSIS — Z98.890 OTHER SPECIFIED POSTPROCEDURAL STATES: Chronic | ICD-10-CM

## 2023-12-19 PROCEDURE — S2900 ROBOTIC SURGICAL SYSTEM: CPT

## 2023-12-19 PROCEDURE — 55880 ABLTJ MAL PRST8 TISS HIFU: CPT

## 2023-12-19 RX ORDER — FENTANYL CITRATE 50 UG/ML
25 INJECTION INTRAVENOUS
Refills: 0 | Status: DISCONTINUED | OUTPATIENT
Start: 2023-12-19 | End: 2023-12-19

## 2023-12-19 RX ORDER — ACETAMINOPHEN 500 MG
1000 TABLET ORAL ONCE
Refills: 0 | Status: COMPLETED | OUTPATIENT
Start: 2023-12-19 | End: 2023-12-19

## 2023-12-19 RX ORDER — ONDANSETRON 8 MG/1
4 TABLET, FILM COATED ORAL ONCE
Refills: 0 | Status: DISCONTINUED | OUTPATIENT
Start: 2023-12-19 | End: 2023-12-19

## 2023-12-19 RX ORDER — SODIUM CHLORIDE 9 MG/ML
500 INJECTION, SOLUTION INTRAVENOUS
Refills: 0 | Status: COMPLETED | OUTPATIENT
Start: 2023-12-19 | End: 2023-12-19

## 2023-12-19 RX ORDER — ALFUZOSIN HYDROCHLORIDE 10 MG/1
1 TABLET, EXTENDED RELEASE ORAL
Qty: 0 | Refills: 0 | DISCHARGE

## 2023-12-19 RX ADMIN — SODIUM CHLORIDE 100 MILLILITER(S): 9 INJECTION, SOLUTION INTRAVENOUS at 13:46

## 2023-12-19 RX ADMIN — Medication 1000 MILLIGRAM(S): at 09:30

## 2023-12-19 NOTE — ASU DISCHARGE PLAN (ADULT/PEDIATRIC) - NS MD DC FALL RISK RISK
For information on Fall & Injury Prevention, visit: [email or web address] OR  [email or web address]-[email or web address]-[email or web address]-injury OR  https://www.cdc.gov/steadi/patient.html For information on Fall & Injury Prevention, visit: [email or web address]://[email or web address]/[email or web address]-[email or web address]-[email or web address]-health-[email or web address]-mobility OR  https://www.[facility]/news/fall-prevention-tips-to-avoid-injury OR  https://www.cdc.gov/steadi/patient.html

## 2023-12-19 NOTE — PRE-ANESTHESIA EVALUATION ADULT - NSANTHOSAYNRD_GEN_A_CORE
No. CYNDIE screening performed.  STOP BANG Legend: 0-2 = LOW Risk; 3-4 = INTERMEDIATE Risk; 5-8 = HIGH Risk

## 2023-12-19 NOTE — ASU DISCHARGE PLAN (ADULT/PEDIATRIC) - ASU DC SPECIAL INSTRUCTIONSFT
HIGH FREQUENCY ULTRASOUND ABLATION OF PROSTATE    GENERAL: It is common to have blood in your urine after your procedure.  It may be pink or even red; and it is important to increase fluid intake to 2-3L of water per day to keep the urine as clear as possible. Please inform your doctor if you have a significant amount of clot in the urine or if you are unable to void at all.  The urine may clear and then become bloody again especially as you are more physically active. It is not uncommon to have some burning when you urinate, this will gradually improve. With a catheter in place, it is not uncommon to have occasional leakage or urine or blood around the catheter. Please call your urologist if this is excessive and/or the urine is not draining through the catheter into the bag.    CATHETER: Most patients are sent home with a Avery catheter, which continuously drains the urine from the bladder. If you still have a catheter, the nurses will review instructions and care before you go home.    PAIN: You may take Tylenol (acetaminophen) 650-975mg and/or Motrin (ibuprofen) 400-600mg, both available over the counter, for pain every 6 hours as needed. Do not exceed 4000mg of Tylenol (acetaminophen) daily. You may alternate these medications such that you take one or the other every 3 hours for around the clock pain coverage.    ANTIBIOTICS: You may be given a prescription for an antibiotic, please take this medication as instructed and be sure to complete the entire course.    STOOL SOFTENERS: Do not allow yourself to become constipated as straining may cause bleeding. Take stool softeners or a laxative (ex. Miralax, Colace, Senokot, ExLax, etc), available over the counter, if needed.    ANTICOAGULATION: If you are taking any blood thinning medications, please discuss with your urologist prior to restarting these medications unless otherwise specified.    BATHING: You may shower or bathe. If going home with avery, shower only until catheter is removed.    DIET: You may resume your regular diet and regular medication regimen.    ACTIVITY: No heavy lifting or strenuous exercise until you are evaluated at your post-operative appointment. Otherwise, you may return to your usual level of physical activity.    FOLLOW-UP: If you did not already schedule your post-operative appointment, please call your urologist to schedule and follow-up appointment.    CALL YOUR UROLOGIST IF: You have any bleeding that does not stop, inability to void >8 hours, fever over 100.4 F, chills, persistent nausea/vomiting, changes in your incision concerning for infection, or if your pain is not controlled on your discharge pain medications.

## 2023-12-19 NOTE — ASU DISCHARGE PLAN (ADULT/PEDIATRIC) - CARE PROVIDER_API CALL
Johnny Acharya  Urology  [address] [place] [id number]  Phone: [phone]  Fax: [phone]  Follow Up Time:    Johnny Acharya  Urology  [address] [place] [id number]  Phone: [phone]  Fax: [phone]  Follow Up Time:

## 2023-12-26 ENCOUNTER — NON-APPOINTMENT (OUTPATIENT)
Age: 53
End: 2023-12-26

## 2023-12-27 ENCOUNTER — APPOINTMENT (OUTPATIENT)
Dept: UROLOGY | Facility: CLINIC | Age: 53
End: 2023-12-27
Payer: COMMERCIAL

## 2023-12-27 PROCEDURE — A4216: CPT | Mod: NC

## 2023-12-27 PROCEDURE — 51700 IRRIGATION OF BLADDER: CPT | Mod: 58

## 2023-12-27 PROCEDURE — 51798 US URINE CAPACITY MEASURE: CPT | Mod: 59

## 2023-12-27 PROCEDURE — 51741 ELECTRO-UROFLOWMETRY FIRST: CPT

## 2023-12-27 PROCEDURE — 99024 POSTOP FOLLOW-UP VISIT: CPT

## 2023-12-27 RX ORDER — SULFAMETHOXAZOLE AND TRIMETHOPRIM 800; 160 MG/1; MG/1
800-160 TABLET ORAL
Qty: 14 | Refills: 0 | Status: DISCONTINUED | COMMUNITY
Start: 2020-12-31 | End: 2023-12-27

## 2023-12-27 NOTE — HISTORY OF PRESENT ILLNESS
[FreeTextEntry1] : The patient is 8 days status post HIFU.  He had 1 episode of fever 2 days ago which has resolved.  He has expected discomfort from the catheter but no pericatheter discharge and urine has been mainly clear.  He has had adequate bowel movements.  The patient was placed on a 5-day course of ciprofloxacin yesterday because of his fever.  Patient presents for trial of void today.

## 2023-12-27 NOTE — ASSESSMENT
[FreeTextEntry1] :  The patient is stable following his procedure with no clinical evidence of ongoing infection.  Today he was able to void twice with a moderate postvoid residual.  I advised him to finish his course of ciprofloxacin and to continue with daily tamsulosin.  If all is stable, he will come back in 1 month and will follow-up with Dr. Vital after that.

## 2023-12-27 NOTE — PHYSICAL EXAM
[Normal Appearance] : normal appearance [Well Groomed] : well groomed [General Appearance - In No Acute Distress] : no acute distress [Edema] : no peripheral edema [Respiration, Rhythm And Depth] : normal respiratory rhythm and effort [Exaggerated Use Of Accessory Muscles For Inspiration] : no accessory muscle use [Abdomen Soft] : soft [Abdomen Tenderness] : non-tender [Costovertebral Angle Tenderness] : no ~M costovertebral angle tenderness [Urethral Meatus] : meatus normal [Urinary Bladder Findings] : the bladder was normal on palpation [Scrotum] : the scrotum was normal [Normal Station and Gait] : the gait and station were normal for the patient's age [] : no rash [No Focal Deficits] : no focal deficits [Oriented To Time, Place, And Person] : oriented to person, place, and time [Affect] : the affect was normal [Mood] : the mood was normal [No Palpable Adenopathy] : no palpable adenopathy [de-identified] : Catheter is in place, draining clear urine

## 2023-12-27 NOTE — LETTER BODY
[Dear  ___] : Dear  [unfilled], [Courtesy Letter:] : I had the pleasure of seeing your patient, [unfilled], in my office today. [Please see my note below.] : Please see my note below. [Consult Closing:] : Thank you very much for allowing me to participate in the care of this patient.  If you have any questions, please do not hesitate to contact me. [Sincerely,] : Sincerely, [FreeTextEntry3] : Johnny

## 2023-12-28 ENCOUNTER — APPOINTMENT (OUTPATIENT)
Dept: UROLOGY | Facility: CLINIC | Age: 53
End: 2023-12-28

## 2024-01-04 ENCOUNTER — APPOINTMENT (OUTPATIENT)
Dept: UROLOGY | Facility: CLINIC | Age: 54
End: 2024-01-04
Payer: COMMERCIAL

## 2024-01-04 VITALS
RESPIRATION RATE: 16 BRPM | BODY MASS INDEX: 23.4 KG/M2 | DIASTOLIC BLOOD PRESSURE: 88 MMHG | OXYGEN SATURATION: 99 % | TEMPERATURE: 97.6 F | HEIGHT: 69 IN | SYSTOLIC BLOOD PRESSURE: 126 MMHG | WEIGHT: 158 LBS | HEART RATE: 105 BPM

## 2024-01-04 DIAGNOSIS — N45.1 EPIDIDYMITIS: ICD-10-CM

## 2024-01-04 PROCEDURE — 51741 ELECTRO-UROFLOWMETRY FIRST: CPT

## 2024-01-04 PROCEDURE — 99214 OFFICE O/P EST MOD 30 MIN: CPT | Mod: 25

## 2024-01-04 PROCEDURE — 51798 US URINE CAPACITY MEASURE: CPT

## 2024-01-04 RX ORDER — CIPROFLOXACIN HYDROCHLORIDE 500 MG/1
500 TABLET, FILM COATED ORAL TWICE DAILY
Qty: 10 | Refills: 1 | Status: DISCONTINUED | COMMUNITY
Start: 2023-12-26 | End: 2024-01-04

## 2024-01-04 NOTE — LETTER BODY
[Dear  ___] : Dear  [unfilled], [Courtesy Letter:] : I had the pleasure of seeing your patient, [unfilled], in my office today. [Please see my note below.] : Please see my note below. [Consult Closing:] : Thank you very much for allowing me to participate in the care of this patient.  If you have any questions, please do not hesitate to contact me. [Sincerely,] : Sincerely, [FreeTextEntry3] : Johnny Acharya MD

## 2024-01-04 NOTE — PHYSICAL EXAM
[Normal Appearance] : normal appearance [Well Groomed] : well groomed [General Appearance - In No Acute Distress] : no acute distress [Edema] : no peripheral edema [Respiration, Rhythm And Depth] : normal respiratory rhythm and effort [Exaggerated Use Of Accessory Muscles For Inspiration] : no accessory muscle use [Abdomen Soft] : soft [Abdomen Tenderness] : non-tender [Costovertebral Angle Tenderness] : no ~M costovertebral angle tenderness [Urethral Meatus] : meatus normal [Urinary Bladder Findings] : the bladder was normal on palpation [Scrotum] : the scrotum was normal [Normal Station and Gait] : the gait and station were normal for the patient's age [] : no rash [No Focal Deficits] : no focal deficits [Oriented To Time, Place, And Person] : oriented to person, place, and time [Affect] : the affect was normal [Mood] : the mood was normal [No Palpable Adenopathy] : no palpable adenopathy [de-identified] : Left epididymal tail indurated, mildly tender. No fluctuance or skin changes

## 2024-01-04 NOTE — ADDENDUM
[FreeTextEntry1] : Next Visit: Uroflow, PVR  Entered by Brie Duffy, acting as scribe for Dr. Johnny Acharya.   The documentation recorded by the scribe accurately reflects the service I personally performed and the decisions made by me.

## 2024-01-04 NOTE — HISTORY OF PRESENT ILLNESS
[FreeTextEntry1] : Patient is 2 weeks status post HIFU and comes in with complaints of a painful, swollen left testicle. He reports associated pain, night sweats for the past 5 days, he denies fever, he is feeling relatively better today. did not take Bactrim post procedure and started taking it yesterday. He is voiding with an intermitted stream, nocturia x 1-2, no dysuria or hematuria. Patient continues to take tamsulosin; he takes tadalafil for his ED with adequate results.

## 2024-01-04 NOTE — ASSESSMENT
[FreeTextEntry1] : The patient's clinical picture is consistent with left epididymitis. We will start him on a 10 day course of Bactrim DS He is voiding adequately with moderate post void residual. He will continue with daily tamsulosin and tadalafil as needed. I encouraged him to maintain good fluid intake and to take Advil/Tylenol as needed. His urine was collected for urinalysis and urine culture.  We advised him to call us if the swelling is not improving or worsens and certainly if he develops fever or increased pain; otherwise, he will come back on 2/1/2024 as planned.

## 2024-01-05 LAB
APPEARANCE: CLEAR
BACTERIA: NEGATIVE /HPF
BILIRUBIN URINE: NEGATIVE
BLOOD URINE: ABNORMAL
CAST: 0 /LPF
COLOR: YELLOW
EPITHELIAL CELLS: 1 /HPF
GLUCOSE QUALITATIVE U: NEGATIVE MG/DL
KETONES URINE: NEGATIVE MG/DL
LEUKOCYTE ESTERASE URINE: ABNORMAL
MICROSCOPIC-UA: NORMAL
NITRITE URINE: NEGATIVE
PH URINE: 6.5
PROTEIN URINE: 100 MG/DL
RED BLOOD CELLS URINE: 114 /HPF
SPECIFIC GRAVITY URINE: 1.01
UROBILINOGEN URINE: 0.2 MG/DL
WHITE BLOOD CELLS URINE: 24 /HPF

## 2024-01-06 LAB — BACTERIA UR CULT: NORMAL

## 2024-01-30 NOTE — H&P PST ADULT - RESPIRATORY AND THORAX
OUTPATIENT NEW PATIENT NOTE    HISTORY    The patient is a very pleasant 79 year old female who comes in today to establish care.  Recently she had been seen by her endocrinologist and was started on Cytomel in addition to her levothyroxine.  She had noticed an improvement in her energy level since and feels much better.  She does not have any additional palpitations or weight loss or hair loss.    She does not have any other questions or concerns today and we went over a past medical problems as well as her medications     Atrial fibrillation, on Xarelto   Atrial septum defect, currently not symptomatic   Parotid gland nodule on the right, she had seen an ENT doctor who evaluated it and we will follow up  Prediabetes, last A1c 5.5   Hypothyroidism, on levothyroxine and Cytomel now, last TSH 0.883   Morbid obesity BMI 45-49, we will discuss on next visit   History of colon polyps, due for next colonoscopy in 26   Solitary kidney, patient has chronic kidney disease stage 3 with creatinine 1.18   Iron-deficiency anemia, current hemoglobin 13.2   Chronic back pain, controlled  Gout stable   Hyperlipidemia with HDL 43 LDL 55 and triglycerides 125   Valvular heart disease with aortic sclerosis, mitral and pulmonary valve regurgitation, not symptomatic   Cataracts, follow up Ophthalmology   Vertigo not an active issue   Pulmonary nodule, followed by Pulmonary Medicine, she also has interstitial lung disease.    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 [employer]  Partner:  She is  since 20/12  Children:  She is 2 adopted children and she and her  had also adopted her daughter's daughter  Pets:  None  Hobbies:  Reading, playing piano, poetry  Nicotine:  Not  Ethanol:  Very rare  Drugs:  None    Health Care Maintenance  Eye Exam:  Not regularly, recommended follow up since she had a cataract  Dental Exam:  Twice  Skin Protection:  Not regular  Colonoscopy:  Patient is  due in 2026  Pelvic Exam/PAP smear:  Not need it since patient has no uterus  Mammography:  We will discuss on next visit    MEDICATIONS  Medications were reviewed and updated today  Outpatient Medications Marked as Taking for the 1/30/24 encounter (Office Visit) with Alli Mcintosh MD   Medication Sig Dispense Refill   • levothyroxine 112 MCG tablet Take 1 tablet by mouth daily. 6 days per week only 90 tablet 3   • liothyronine (Cytomel) 5 MCG tablet Take 1 tablet by mouth daily. 90 tablet 0   • ferrous sulfate 325 (65 FE) MG tablet Take 2 tablets by mouth daily. 60 tablet 0   • rivaroxaban (Xarelto) 20 MG Tab TAKE 1 TABLET BY MOUTH DAILY WITH BREAKFAST 90 tablet 2   • allopurinol (ZYLOPRIM) 100 MG tablet TAKE 2 TABLETS BY MOUTH DAILY 60 tablet 0   • timolol (BLOCADREN) 10 MG tablet Take 0.5 tablets by mouth daily. 45 tablet 1   • atorvastatin (LIPITOR) 10 MG tablet Take 1 tablet by mouth daily. 90 tablet 1   • Ascorbic Acid (vitamin C) 500 MG tablet Take 1 tablet by mouth daily. 30 tablet 0   • Cholecalciferol 5000 units Cap Take 1 capsule by mouth daily. 30 capsule    • Multiple Vitamin (MULTI-VITAMINS) Tab Take 1 tablet by mouth daily. 30 tablet        ALLERGIES  Allergies as of 01/30/2024 - Reviewed 01/30/2024   Allergen Reaction Noted   • Dye [contrast media] CARDIAC DISTURBANCES        HISTORIES  Past Medical History:   Diagnosis Date   • Adenomatous colon polyp    • Aortic valve sclerosis 3/20/2018   • Cataracts, bilateral    • Congenital absence of uterus    • Elevated serum creatinine 01/01/2013   • Gallstones    • Gout    • Hives    • Hyperthyroidism    • Hypothyroidism    • Mild mitral regurgitation 3/20/2018   • Mitral valve prolapse     and PAT   • MVP (mitral valve prolapse) 11/5/2013   • Prediabetes    • Pulmonary valve regurgitation, acquired 3/20/2018   • Solitary kidney     right groin     Past Surgical History:   Procedure Laterality Date   • Colonoscopy  3/2/16    repeat in 5 yrs. adenoma  details…

## 2024-02-01 ENCOUNTER — APPOINTMENT (OUTPATIENT)
Dept: UROLOGY | Facility: CLINIC | Age: 54
End: 2024-02-01
Payer: COMMERCIAL

## 2024-02-01 PROCEDURE — 51741 ELECTRO-UROFLOWMETRY FIRST: CPT

## 2024-02-01 PROCEDURE — 51798 US URINE CAPACITY MEASURE: CPT

## 2024-02-01 PROCEDURE — 99214 OFFICE O/P EST MOD 30 MIN: CPT | Mod: 25

## 2024-02-01 RX ORDER — TADALAFIL 20 MG/1
20 TABLET ORAL DAILY
Qty: 18 | Refills: 3 | Status: ACTIVE | COMMUNITY
Start: 2024-02-01 | End: 1900-01-01

## 2024-02-01 RX ORDER — TADALAFIL 20 MG/1
20 TABLET ORAL
Qty: 6 | Refills: 5 | Status: DISCONTINUED | COMMUNITY
Start: 2022-07-15 | End: 2024-02-01

## 2024-02-01 RX ORDER — SULFAMETHOXAZOLE AND TRIMETHOPRIM 400; 80 MG/1; MG/1
400-80 TABLET ORAL TWICE DAILY
Qty: 10 | Refills: 1 | Status: DISCONTINUED | COMMUNITY
Start: 2023-12-19 | End: 2024-02-01

## 2024-02-01 RX ORDER — SULFAMETHOXAZOLE AND TRIMETHOPRIM 800; 160 MG/1; MG/1
800-160 TABLET ORAL
Qty: 10 | Refills: 0 | Status: DISCONTINUED | COMMUNITY
Start: 2023-12-26 | End: 2024-02-01

## 2024-02-02 LAB
APPEARANCE: ABNORMAL
BACTERIA: ABNORMAL /HPF
BILIRUBIN URINE: NEGATIVE
BLOOD URINE: ABNORMAL
CAST: 11 /LPF
COARSE GRANULAR CASTS: PRESENT
COLOR: YELLOW
EPITHELIAL CELLS: 1 /HPF
GLUCOSE QUALITATIVE U: NEGATIVE MG/DL
KETONES URINE: NEGATIVE MG/DL
LEUKOCYTE ESTERASE URINE: ABNORMAL
MICROSCOPIC-UA: NORMAL
NITRITE URINE: POSITIVE
PH URINE: 6.5
PROTEIN URINE: 100 MG/DL
RED BLOOD CELLS URINE: 175 /HPF
REVIEW: NORMAL
SPECIFIC GRAVITY URINE: 1.01
UROBILINOGEN URINE: 0.2 MG/DL
WBC CLUMPS: PRESENT
WHITE BLOOD CELLS URINE: 847 /HPF

## 2024-02-02 RX ORDER — MUPIROCIN 2 G/100G
2 CREAM TOPICAL TWICE DAILY
Qty: 1 | Refills: 1 | Status: DISCONTINUED | COMMUNITY
Start: 2023-12-19 | End: 2024-02-02

## 2024-02-02 RX ORDER — CHLORHEXIDINE/GLYCERIN/HE-CELL
JELLY (GRAM) TOPICAL
Qty: 3 | Refills: 11 | Status: DISCONTINUED | OUTPATIENT
Start: 2020-11-25 | End: 2024-02-02

## 2024-02-02 NOTE — PHYSICAL EXAM
[Normal Appearance] : normal appearance [Well Groomed] : well groomed [General Appearance - In No Acute Distress] : no acute distress [Edema] : no peripheral edema [Respiration, Rhythm And Depth] : normal respiratory rhythm and effort [Exaggerated Use Of Accessory Muscles For Inspiration] : no accessory muscle use [Abdomen Soft] : soft [Abdomen Tenderness] : non-tender [Costovertebral Angle Tenderness] : no ~M costovertebral angle tenderness [Urethral Meatus] : meatus normal [Urinary Bladder Findings] : the bladder was normal on palpation [Scrotum] : the scrotum was normal [Normal Station and Gait] : the gait and station were normal for the patient's age [] : no rash [No Focal Deficits] : no focal deficits [Oriented To Time, Place, And Person] : oriented to person, place, and time [Affect] : the affect was normal [Mood] : the mood was normal [No Palpable Adenopathy] : no palpable adenopathy [Epididymis] : the epididymides were normal [Testes Tenderness] : no tenderness of the testes [Testes Mass (___cm)] : there were no testicular masses [Prostate Tenderness] : the prostate was not tender [No Prostate Nodules] : no prostate nodules [Prostate Size ___ (0-4)] : prostate size [unfilled] (scale: 0-4) [de-identified] : R lobe > Left

## 2024-02-02 NOTE — ADDENDUM
[FreeTextEntry1] : Next Visit: Uroflow, PVR, PSA  Entered by Brie Duffy, acting as scribe for Dr. Johnny Acharya.   The documentation recorded by the scribe accurately reflects the service I personally performed and the decisions made by me.

## 2024-02-02 NOTE — ASSESSMENT
[FreeTextEntry1] : Patient is stable clinically month post HIFU.  He has resolved epididymitis and has moderate LUTS and post void residual.  He will continue with tamsulosin for 1 more month and then stop; we did advise him to restart the medication if he notices significant slowing of his urine at that point.  The patient will be placed back on tadalafil, 20 mg as needed to help with his erections.  We did send his urine for analysis and culture and if that is unremarkable, he will follow-up in 2 months and will have his first post-procedure PSA at that time.  He understands as per protocol he will require PSAs every 3 months, MRI at 6 months post procedure and a repeat prostate biopsy at 1 year.

## 2024-02-02 NOTE — HISTORY OF PRESENT ILLNESS
[FreeTextEntry1] : Patient is 1 month status post HIFU with no new voiding complaints. He does report that his urine is not clear and reports seeing "pieces" in his urine. His testicular pain has resolved. He is voiding with a slow stream, nocturia x 0-1, no dysuria or hematuria. Patient continues to take tamsulosin; he has not been taking tadalafil and does describe difficulty achieving and maintaining his erection.

## 2024-02-04 LAB — BACTERIA UR CULT: ABNORMAL

## 2024-04-04 ENCOUNTER — APPOINTMENT (OUTPATIENT)
Dept: UROLOGY | Facility: CLINIC | Age: 54
End: 2024-04-04
Payer: COMMERCIAL

## 2024-04-04 VITALS — SYSTOLIC BLOOD PRESSURE: 132 MMHG | DIASTOLIC BLOOD PRESSURE: 77 MMHG | HEART RATE: 73 BPM | TEMPERATURE: 98.7 F

## 2024-04-04 DIAGNOSIS — N40.1 BENIGN PROSTATIC HYPERPLASIA WITH LOWER URINARY TRACT SYMPMS: ICD-10-CM

## 2024-04-04 DIAGNOSIS — C61 MALIGNANT NEOPLASM OF PROSTATE: ICD-10-CM

## 2024-04-04 DIAGNOSIS — N52.9 MALE ERECTILE DYSFUNCTION, UNSPECIFIED: ICD-10-CM

## 2024-04-04 DIAGNOSIS — N13.8 BENIGN PROSTATIC HYPERPLASIA WITH LOWER URINARY TRACT SYMPMS: ICD-10-CM

## 2024-04-04 PROCEDURE — 99214 OFFICE O/P EST MOD 30 MIN: CPT | Mod: 25

## 2024-04-04 PROCEDURE — 51798 US URINE CAPACITY MEASURE: CPT

## 2024-04-04 PROCEDURE — 51741 ELECTRO-UROFLOWMETRY FIRST: CPT

## 2024-04-05 LAB
APPEARANCE: CLEAR
BACTERIA: NEGATIVE /HPF
BILIRUBIN URINE: NEGATIVE
BLOOD URINE: NEGATIVE
CAST: 0 /LPF
COLOR: YELLOW
EPITHELIAL CELLS: 0 /HPF
GLUCOSE QUALITATIVE U: NEGATIVE MG/DL
KETONES URINE: NEGATIVE MG/DL
LEUKOCYTE ESTERASE URINE: NEGATIVE
MICROSCOPIC-UA: NORMAL
NITRITE URINE: NEGATIVE
PH URINE: 7
PROTEIN URINE: NEGATIVE MG/DL
PSA SERPL-MCNC: 7.9 NG/ML
RED BLOOD CELLS URINE: 0 /HPF
SPECIFIC GRAVITY URINE: 1.01
UROBILINOGEN URINE: 0.2 MG/DL
WHITE BLOOD CELLS URINE: 0 /HPF

## 2024-04-07 PROBLEM — N40.1 BENIGN LOCALIZED HYPERPLASIA OF PROSTATE WITH URINARY OBSTRUCTION: Status: ACTIVE | Noted: 2020-12-29

## 2024-04-07 PROBLEM — C61 PROSTATE CANCER: Status: ACTIVE | Noted: 2023-11-06

## 2024-04-07 PROBLEM — N52.9 ERECTILE DYSFUNCTION: Status: ACTIVE | Noted: 2022-07-15

## 2024-04-07 LAB — BACTERIA UR CULT: NORMAL

## 2024-04-07 RX ORDER — TAMSULOSIN HYDROCHLORIDE 0.4 MG/1
0.4 CAPSULE ORAL
Qty: 90 | Refills: 3 | Status: DISCONTINUED | COMMUNITY
Start: 2023-12-14 | End: 2024-04-07

## 2024-04-07 RX ORDER — SILDENAFIL 100 MG/1
100 TABLET, FILM COATED ORAL
Qty: 18 | Refills: 3 | Status: ACTIVE | COMMUNITY
Start: 2024-04-07 | End: 1900-01-01

## 2024-04-07 RX ORDER — LEVOFLOXACIN 500 MG/1
500 TABLET, FILM COATED ORAL DAILY
Qty: 10 | Refills: 1 | Status: DISCONTINUED | COMMUNITY
Start: 2024-02-02 | End: 2024-04-07

## 2024-07-20 ENCOUNTER — RESULT REVIEW (OUTPATIENT)
Age: 54
End: 2024-07-20

## 2024-07-20 ENCOUNTER — APPOINTMENT (OUTPATIENT)
Dept: MRI IMAGING | Facility: IMAGING CENTER | Age: 54
End: 2024-07-20

## 2024-07-20 ENCOUNTER — OUTPATIENT (OUTPATIENT)
Dept: OUTPATIENT SERVICES | Facility: HOSPITAL | Age: 54
LOS: 1 days | End: 2024-07-20
Payer: COMMERCIAL

## 2024-07-20 DIAGNOSIS — C61 MALIGNANT NEOPLASM OF PROSTATE: ICD-10-CM

## 2024-07-20 PROCEDURE — 72197 MRI PELVIS W/O & W/DYE: CPT | Mod: 26

## 2024-07-20 PROCEDURE — 72197 MRI PELVIS W/O & W/DYE: CPT

## 2024-07-20 PROCEDURE — A9585: CPT

## 2024-07-20 PROCEDURE — 76498P: CUSTOM | Mod: 26

## 2024-07-20 PROCEDURE — 76498 UNLISTED MR PROCEDURE: CPT

## 2024-08-02 ENCOUNTER — OUTPATIENT (OUTPATIENT)
Dept: OUTPATIENT SERVICES | Facility: HOSPITAL | Age: 54
LOS: 1 days | End: 2024-08-02
Payer: COMMERCIAL

## 2024-08-02 DIAGNOSIS — Z98.890 OTHER SPECIFIED POSTPROCEDURAL STATES: Chronic | ICD-10-CM

## 2024-08-02 DIAGNOSIS — R97.20 ELEVATED PROSTATE SPECIFIC ANTIGEN [PSA]: ICD-10-CM

## 2024-08-02 PROCEDURE — C8001: CPT

## 2024-08-05 ENCOUNTER — APPOINTMENT (OUTPATIENT)
Dept: UROLOGY | Facility: CLINIC | Age: 54
End: 2024-08-05

## 2024-09-10 ENCOUNTER — APPOINTMENT (OUTPATIENT)
Dept: UROLOGY | Facility: CLINIC | Age: 54
End: 2024-09-10

## 2024-09-10 ENCOUNTER — OUTPATIENT (OUTPATIENT)
Dept: OUTPATIENT SERVICES | Facility: HOSPITAL | Age: 54
LOS: 1 days | End: 2024-09-10
Payer: COMMERCIAL

## 2024-09-10 VITALS — HEART RATE: 68 BPM | DIASTOLIC BLOOD PRESSURE: 85 MMHG | SYSTOLIC BLOOD PRESSURE: 143 MMHG

## 2024-09-10 DIAGNOSIS — R35.0 FREQUENCY OF MICTURITION: ICD-10-CM

## 2024-09-10 DIAGNOSIS — N13.8 BENIGN PROSTATIC HYPERPLASIA WITH LOWER URINARY TRACT SYMPMS: ICD-10-CM

## 2024-09-10 DIAGNOSIS — N40.1 BENIGN PROSTATIC HYPERPLASIA WITH LOWER URINARY TRACT SYMPMS: ICD-10-CM

## 2024-09-10 DIAGNOSIS — R97.20 ELEVATED PROSTATE, SPECIFIC ANTIGEN [PSA]: ICD-10-CM

## 2024-09-10 DIAGNOSIS — Z98.890 OTHER SPECIFIED POSTPROCEDURAL STATES: Chronic | ICD-10-CM

## 2024-09-10 PROCEDURE — 76999F: CUSTOM | Mod: 26

## 2024-09-10 PROCEDURE — ZZZZZ: CPT

## 2024-09-10 PROCEDURE — 55700: CPT

## 2024-09-10 PROCEDURE — 76999 ECHO EXAMINATION PROCEDURE: CPT

## 2024-09-10 NOTE — ASSESSMENT
[FreeTextEntry1] : 52 yo M with BPH and GG1 CaP  #CaP - Repeat biopsy today  - May decide on definitive therapy should have recurrent disease vs AS

## 2024-09-10 NOTE — PHYSICAL EXAM
[Normal Appearance] : normal appearance [Well Groomed] : well groomed [General Appearance - In No Acute Distress] : no acute distress [Edema] : no peripheral edema [Respiration, Rhythm And Depth] : normal respiratory rhythm and effort [Exaggerated Use Of Accessory Muscles For Inspiration] : no accessory muscle use [Abdomen Soft] : soft [Abdomen Tenderness] : non-tender [Urinary Bladder Findings] : the bladder was normal on palpation [Normal Station and Gait] : the gait and station were normal for the patient's age [] : no rash [No Focal Deficits] : no focal deficits [Oriented To Time, Place, And Person] : oriented to person, place, and time [Affect] : the affect was normal [Mood] : the mood was normal

## 2024-09-10 NOTE — HISTORY OF PRESENT ILLNESS
[FreeTextEntry1] :  This telephonic visit was provided via audiovisual technology. The patient, ADRIAN ENRIQUEZ , was located at [facility] at the time of the visit. The provider, KANNAN ORTIZ , was located at [place], [place] at the time of the visit. The patient, ADRIAN ENRIQUEZ and Provider participated in the telephonic visit.  Verbal consent for telephonic services was given on Nov 6 2023  2:00PM by the patient, ADRIAN ENRIQUEZ . The patient-doctor relationship has been established in audio HIPAA compliant communication. The patient's identity has been confirmed. The patient was previously emailed a copy of the telemedicine consent. They have had a chance to review and has now given verbal consent and has requested care to be assessed and treated via telemedicine. They understand there may be limitations in this process, and that they may need further followup care in the office and/or hospital settings.  Verbal consent given on Nov 6 2023  2:00PM  51 yo M s/p MR fusion biopsy, reviewed results with patient. MR with 1 cm target located in left posterior lesion, abutting SV. Template biopsy negative, fusion biopsy notable for 1 core GG1 CaP iin target lesion only. We discussed treatment options. Today we discussed the natural history of localized prostate cancer, and the heterogeneous biology of this malignancy. We discussed the fact that many prostate cancers are slow growing and unlikely to metastasize or cause death, whereas others can be life threatening. We reviewed risk stratification, staging, Luna scoring, and PSA levels as they pertain to risk.   All treatment options were reviewed. This included active surveillance, androgen deprivation, emerging options such as focal therapy/HIFU/cryotherapy, radiation options (including IMRT, SBRT, brachytherapy) and surgical options (open vs. robotic surgery, nerve vs. non-nerve sparing). Oncologic outcomes were compared and contrasted. Risks of biochemical and clinical recurrence discussed. Risks of needing adjuvant or salvage treatments reviewed. We discussed the risks of secondary malignancy after radiation. We discussed the opportunity for pathological staging with surgery vs. other options. We discussed the possibility of positive margins, treatment failure, cancer recurrence and cancer-related death even with treatment.   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 .Treatment options include focal therapy, HIFU were discussed at length. We will plan for HIFU of focal lesion. Plan for possible RASP given 92 cc gland. We discussed his symptomms and next steps.   9/10/2024: Here for repeat MR fusion biopsy after HIFU therapy had post procedural MRI demonstrating Pz lesion on the L side with 103g gland. PIRADS4 lesion. We discussed options, we will plan for repeat biopsy today. If recurrent tumor, may need to consider definitive therapy vs active surveillance.  [Urinary Incontinence] : no urinary incontinence [Urinary Retention] : no urinary retention [Urinary Urgency] : no urinary urgency [Urinary Frequency] : no urinary frequency

## 2024-09-11 DIAGNOSIS — R97.20 ELEVATED PROSTATE SPECIFIC ANTIGEN [PSA]: ICD-10-CM

## 2024-09-13 LAB — PROSTATE BIOPSY: NORMAL

## 2024-09-30 NOTE — DISCHARGE NOTE NURSING/CASE MANAGEMENT/SOCIAL WORK - NSDPDISTO_GEN_ALL_CORE
Detail Level: Zone
Plan: TAC cream- patient did not fill RX, was able to see improvement with OTC Eucerin and changing laundry detergent.
Render In Strict Bullet Format?: No
Home

## 2025-03-27 ENCOUNTER — APPOINTMENT (OUTPATIENT)
Dept: UROLOGY | Facility: CLINIC | Age: 55
End: 2025-03-27
Payer: COMMERCIAL

## 2025-03-27 VITALS
SYSTOLIC BLOOD PRESSURE: 132 MMHG | HEART RATE: 77 BPM | BODY MASS INDEX: 22.96 KG/M2 | HEIGHT: 69 IN | OXYGEN SATURATION: 96 % | DIASTOLIC BLOOD PRESSURE: 87 MMHG | TEMPERATURE: 97.8 F | WEIGHT: 155 LBS

## 2025-03-27 DIAGNOSIS — N13.8 BENIGN PROSTATIC HYPERPLASIA WITH LOWER URINARY TRACT SYMPMS: ICD-10-CM

## 2025-03-27 DIAGNOSIS — C61 MALIGNANT NEOPLASM OF PROSTATE: ICD-10-CM

## 2025-03-27 DIAGNOSIS — N52.9 MALE ERECTILE DYSFUNCTION, UNSPECIFIED: ICD-10-CM

## 2025-03-27 DIAGNOSIS — N40.1 BENIGN PROSTATIC HYPERPLASIA WITH LOWER URINARY TRACT SYMPMS: ICD-10-CM

## 2025-03-27 DIAGNOSIS — R97.20 ELEVATED PROSTATE, SPECIFIC ANTIGEN [PSA]: ICD-10-CM

## 2025-03-27 PROCEDURE — 51741 ELECTRO-UROFLOWMETRY FIRST: CPT

## 2025-03-27 PROCEDURE — 99214 OFFICE O/P EST MOD 30 MIN: CPT | Mod: 25

## 2025-03-27 PROCEDURE — 51798 US URINE CAPACITY MEASURE: CPT

## 2025-03-27 RX ORDER — TAMSULOSIN HYDROCHLORIDE 0.4 MG/1
0.4 CAPSULE ORAL
Qty: 90 | Refills: 3 | Status: ACTIVE | COMMUNITY
Start: 2025-03-27 | End: 1900-01-01

## 2025-03-28 LAB — PSA SERPL-MCNC: 6.65 NG/ML

## 2025-05-09 ENCOUNTER — APPOINTMENT (OUTPATIENT)
Dept: MRI IMAGING | Facility: IMAGING CENTER | Age: 55
End: 2025-05-09

## 2025-05-09 ENCOUNTER — OUTPATIENT (OUTPATIENT)
Dept: OUTPATIENT SERVICES | Facility: HOSPITAL | Age: 55
LOS: 1 days | End: 2025-05-09
Payer: COMMERCIAL

## 2025-05-09 ENCOUNTER — RESULT REVIEW (OUTPATIENT)
Age: 55
End: 2025-05-09

## 2025-05-09 DIAGNOSIS — Z00.8 ENCOUNTER FOR OTHER GENERAL EXAMINATION: ICD-10-CM

## 2025-05-09 DIAGNOSIS — Z98.890 OTHER SPECIFIED POSTPROCEDURAL STATES: Chronic | ICD-10-CM

## 2025-05-09 DIAGNOSIS — C61 MALIGNANT NEOPLASM OF PROSTATE: ICD-10-CM

## 2025-05-09 PROCEDURE — 72197 MRI PELVIS W/O & W/DYE: CPT | Mod: 26

## 2025-05-09 PROCEDURE — 76498P: CUSTOM | Mod: 26

## 2025-05-09 PROCEDURE — A9585: CPT

## 2025-05-09 PROCEDURE — 72197 MRI PELVIS W/O & W/DYE: CPT

## 2025-05-09 PROCEDURE — 76498 UNLISTED MR PROCEDURE: CPT

## 2025-06-26 ENCOUNTER — APPOINTMENT (OUTPATIENT)
Dept: UROLOGY | Facility: CLINIC | Age: 55
End: 2025-06-26
Payer: COMMERCIAL

## 2025-06-26 VITALS
HEIGHT: 69 IN | WEIGHT: 156 LBS | SYSTOLIC BLOOD PRESSURE: 116 MMHG | BODY MASS INDEX: 23.11 KG/M2 | DIASTOLIC BLOOD PRESSURE: 75 MMHG | HEART RATE: 64 BPM | OXYGEN SATURATION: 98 % | TEMPERATURE: 98.8 F

## 2025-06-26 PROCEDURE — 99214 OFFICE O/P EST MOD 30 MIN: CPT | Mod: 25

## 2025-06-26 PROCEDURE — 51798 US URINE CAPACITY MEASURE: CPT

## 2025-06-26 PROCEDURE — 51741 ELECTRO-UROFLOWMETRY FIRST: CPT

## 2025-06-28 ENCOUNTER — NON-APPOINTMENT (OUTPATIENT)
Age: 55
End: 2025-06-28

## 2025-06-28 LAB — PSA SERPL-MCNC: 7.83 NG/ML

## (undated) DEVICE — GLV 7.5 PROTEXIS (WHITE)

## (undated) DEVICE — FOLEY TRAY 14FR 5CC LF UMETER CLOSED

## (undated) DEVICE — Device

## (undated) DEVICE — FOLEY CATH 2-WAY 18FR 5CC SILICONE ELASTOMER LATEX